# Patient Record
Sex: FEMALE | Race: BLACK OR AFRICAN AMERICAN | NOT HISPANIC OR LATINO
[De-identification: names, ages, dates, MRNs, and addresses within clinical notes are randomized per-mention and may not be internally consistent; named-entity substitution may affect disease eponyms.]

---

## 2019-10-04 ENCOUNTER — TELEPHONE (OUTPATIENT)
Dept: SCHEDULING | Age: 44
End: 2019-10-04

## 2019-11-07 ENCOUNTER — WALK IN (OUTPATIENT)
Dept: URGENT CARE | Age: 44
End: 2019-11-07

## 2019-11-07 DIAGNOSIS — Z11.1 SCREENING FOR TUBERCULOSIS: Primary | ICD-10-CM

## 2019-11-07 PROCEDURE — 86580 TB INTRADERMAL TEST: CPT | Performed by: NURSE PRACTITIONER

## 2019-11-10 ENCOUNTER — WALK IN (OUTPATIENT)
Dept: URGENT CARE | Age: 44
End: 2019-11-10

## 2019-11-10 DIAGNOSIS — Z11.1 SCREENING FOR TUBERCULOSIS: Primary | ICD-10-CM

## 2019-11-10 LAB
INDURATION: NORMAL MM (ref 0–?)
SKIN TEST RESULT: NEGATIVE

## 2019-11-10 PROCEDURE — X1094 NO CHARGE VISIT: HCPCS | Performed by: NURSE PRACTITIONER

## 2020-11-20 ENCOUNTER — TELEPHONE (OUTPATIENT)
Dept: SCHEDULING | Age: 45
End: 2020-11-20

## 2020-11-20 ENCOUNTER — IMMUNIZATION (OUTPATIENT)
Dept: URGENT CARE | Age: 45
End: 2020-11-20

## 2020-11-20 DIAGNOSIS — Z23 NEEDS FLU SHOT: Primary | ICD-10-CM

## 2020-11-20 PROCEDURE — 90686 IIV4 VACC NO PRSV 0.5 ML IM: CPT | Performed by: NURSE PRACTITIONER

## 2020-11-20 PROCEDURE — 90471 IMMUNIZATION ADMIN: CPT | Performed by: NURSE PRACTITIONER

## 2021-07-22 ENCOUNTER — APPOINTMENT (OUTPATIENT)
Dept: RADIOLOGY | Facility: HOSPITAL | Age: 46
End: 2021-07-22
Payer: COMMERCIAL

## 2021-07-22 ENCOUNTER — HOSPITAL ENCOUNTER (EMERGENCY)
Facility: HOSPITAL | Age: 46
Discharge: HOME | End: 2021-07-22
Attending: FAMILY MEDICINE
Payer: COMMERCIAL

## 2021-07-22 VITALS
HEART RATE: 76 BPM | HEIGHT: 66 IN | SYSTOLIC BLOOD PRESSURE: 135 MMHG | BODY MASS INDEX: 40.98 KG/M2 | OXYGEN SATURATION: 94 % | RESPIRATION RATE: 20 BRPM | DIASTOLIC BLOOD PRESSURE: 88 MMHG | WEIGHT: 255 LBS | TEMPERATURE: 98 F

## 2021-07-22 DIAGNOSIS — S80.12XA CONTUSION OF LEFT LOWER EXTREMITY, INITIAL ENCOUNTER: ICD-10-CM
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
## 2021-07-22 DIAGNOSIS — M79.605 LEFT LEG PAIN: ICD-10-CM

## 2021-07-22 DIAGNOSIS — M79.644 FINGER PAIN, RIGHT: ICD-10-CM

## 2021-07-22 DIAGNOSIS — S63.650A SPRAIN OF METACARPOPHALANGEAL (MCP) JOINT OF RIGHT INDEX FINGER, INITIAL ENCOUNTER: ICD-10-CM

## 2021-07-22 PROCEDURE — 99282 EMERGENCY DEPT VISIT SF MDM: CPT

## 2021-07-22 PROCEDURE — 6370000100 HC RX 637 (ALT 250 FOR IP): Performed by: FAMILY MEDICINE

## 2021-07-22 PROCEDURE — 73590 X-RAY EXAM OF LOWER LEG: CPT | Mod: LT

## 2021-07-22 PROCEDURE — 73140 X-RAY EXAM OF FINGER(S): CPT | Mod: RT

## 2021-07-22 PROCEDURE — 99282 EMERGENCY DEPT VISIT SF MDM: CPT | Performed by: FAMILY MEDICINE

## 2021-07-22 PROCEDURE — 99283 EMERGENCY DEPT VISIT LOW MDM: CPT | Performed by: FAMILY MEDICINE

## 2021-07-22 RX ORDER — NAPROXEN 500 MG/1
500 TABLET ORAL 2 TIMES DAILY
COMMUNITY

## 2021-07-22 RX ORDER — TIZANIDINE 2 MG/1
2 TABLET ORAL
COMMUNITY
Start: 2020-10-23

## 2021-07-22 RX ORDER — ACETAMINOPHEN 500 MG
500 TABLET ORAL
COMMUNITY

## 2021-07-22 RX ORDER — FLUOCINOLONE ACETONIDE 0.11 MG/ML
OIL TOPICAL
COMMUNITY
Start: 2020-11-12

## 2021-07-22 RX ORDER — MELOXICAM 15 MG/1
15 TABLET ORAL DAILY
COMMUNITY
Start: 2021-07-12

## 2021-07-22 RX ORDER — AMITRIPTYLINE HYDROCHLORIDE 10 MG/1
TABLET, FILM COATED ORAL
COMMUNITY
Start: 2021-07-12

## 2021-07-22 RX ORDER — HYDROCORTISONE 25 MG/G
CREAM TOPICAL
COMMUNITY
Start: 2020-11-12

## 2021-07-22 RX ORDER — OMEPRAZOLE 20 MG/1
CAPSULE, DELAYED RELEASE ORAL
COMMUNITY
Start: 2020-09-14

## 2021-07-22 RX ORDER — HYDROCODONE BITARTRATE AND ACETAMINOPHEN 5; 325 MG/1; MG/1
1 TABLET ORAL ONCE
Status: COMPLETED | OUTPATIENT
Start: 2021-07-22 | End: 2021-07-22

## 2021-07-22 RX ADMIN — HYDROCODONE BITARTRATE AND ACETAMINOPHEN 1 PACKAGE: 5; 325 TABLET ORAL at 17:06

## 2021-07-22 RX ADMIN — HYDROCODONE BITARTRATE AND ACETAMINOPHEN 1 TABLET: 5; 325 TABLET ORAL at 16:15

## 2021-07-22 NOTE — ED PROVIDER NOTES
EMERGENCY NOTE    Chief Complaint:  Chief Complaint   Patient presents with   • Fall     c/o left shin pain   • Hand Pain     right pointer finger          HPI:  Sourav Delcid is a 46 y.o. female who comes in for evaluation of left shin pain and right index finger pain after a fall.  Patient states that she was over a stack a [employer] and had tripped and fell into an embankment.  She states she landed on her shin and is having pain there as well as in her right finger.  She states her swelling there.  She has some minor abrasions on her hands that she wants to make sure do not need stitches.  She is traveling here.    HISTORY:  Past Medical History:   Diagnosis Date   • Anxiety    • Arthritis    • Endometriosis    • Migraine    • Myofascial pain        Past Surgical History:   Procedure Laterality Date   •  SECTION, LOW TRANSVERSE     • CHOLECYSTECTOMY     • HYSTERECTOMY     • KNEE ARTHROSCOPY     • PELVIC LAPAROSCOPY         History reviewed. No pertinent family history.    Social History     Tobacco Use   • Smoking status: Former Smoker   • Smokeless tobacco: Never Used   Substance Use Topics   • Alcohol use: Yes   • Drug use: Not Currently       Allergies   Allergen Reactions   • Bee Pollen Swelling   • Latex Hives   • Penicillins      Childhood allergy per her mother   • Shellfish Derived          Current Outpatient Medications:   •  amitriptyline (ELAVIL) 10 mg tablet, TAKE 1 TABLET BY MOUTH DAILY, Disp: , Rfl:   •  meloxicam (MOBIC) 15 mg tablet, Take 15 mg by mouth daily, Disp: , Rfl:   •  omeprazole (PriLOSEC) 20 mg capsule, TAKE ONE CAPSULE BY MOUTH EVERY DAY, Disp: , Rfl:   •  naproxen (NAPROSYN) 500 mg tablet, Take 500 mg by mouth 2 times daily, Disp: , Rfl:   •  hydrocortisone 2.5 % cream, Apply topically, Disp: , Rfl:   •  fluocinolone (DERMA-SMOOTHE) 0.01 % external oil, Apply topically, Disp: , Rfl:   •  tiZANidine (ZANAFLEX) 2 mg tablet, Take 2 mg by mouth, Disp: , Rfl:   •  acetaminophen  (TYLENOL) 500 mg tablet, Take 500 mg by mouth, Disp: , Rfl:       ROS:  Review of Systems  12 point review of systems performed that was negative unless otherwise mentioned per HPI.    PE:  ED Triage Vitals [07/22/21 1508]   Temp Heart Rate Resp BP SpO2   36.6 °C (97.9 °F) 100 18 169/98 94 %      Temp Source Heart Rate Source Patient Position BP Location FiO2 (%)   Oral -- Head of bed 30 degrees or higher -- --       Physical Exam  Vitals and nursing note reviewed.   Constitutional:       General: She is not in acute distress.     Appearance: She is obese. She is not ill-appearing or toxic-appearing.   HENT:      Head: Normocephalic and atraumatic.      Nose: Nose normal.      Mouth/Throat:      Mouth: Mucous membranes are moist.   Eyes:      Extraocular Movements: Extraocular movements intact.   Cardiovascular:      Rate and Rhythm: Normal rate and regular rhythm.   Pulmonary:      Effort: Pulmonary effort is normal.      Breath sounds: Normal breath sounds.   Musculoskeletal:      Cervical back: Neck supple.      Comments: Minor abrasion noted on the anterior mid left shin.  No significant bleeding noted.  She has a small hematoma just underneath the abrasion as well.  She is tender to palpation here.  Sensation light touch intact.  Bilateral palmar surfaces of hands have few very shallow minor superficial abrasions.  Her right index finger looks swollen and she has some tenderness at the right MCP joint there she is able to move it.  She has no loss of sensation.  2+ radial pulses noted bilaterally.   Neurological:      Mental Status: She is alert and oriented to person, place, and time.   Psychiatric:         Mood and Affect: Mood normal.           ED LABS:  Labs Reviewed - No data to display    ED IMAGES:  X-ray finger 2 or more views right   Final Result   IMPRESSION:   No acute osseous abnormality      X-ray tibia fibula 2 views left   Final Result   IMPRESSION:   No acute osseous abnormality           PROCEDURE    Procedures      ED MEDS  Medications   HYDROcodone-acetaminophen (NORCO) 5-325 mg per tablet 1 tablet (1 tablet oral Given 7/22/21 1615)   HYDROcodone-acetaminophen (NORCO) 5-325 mg Disp: #12 tablet - ED DOSE PACK 1 Package (1 Package oral ED take home pack 7/22/21 1704)       ED DIAGNOSIS  Final diagnoses:   [W19.XXXA] Fall, initial encounter   [M79.605] Left leg pain   [M79.644] Finger pain, right   [S80.12XA] Contusion of left lower extremity, initial encounter   [S63.650A] Sprain of metacarpophalangeal (MCP) joint of right index finger, initial encounter           ED COURSE:  Sourav is a 46 year old that presents to the emergency department for evaluation of left lower leg pain and right finger pain after a fall that she sustained a stack a leg.  This is a ground-level fall when she tripped.  She is having some significant pain when she presents here and so she was given some hydrocodone to help with that.  Upon reevaluation her pain had significantly improved.  Physical exam as noted above.  Her superficial abrasions on the palmar surfaces of her hands were cleansed thoroughly.  I did get radiographs of her left tib-fib as well as her right index finger to make sure she did not have any associated fractures or anything like that.  These came back normal.  Likely is suffering from some contusion of that leg and finger sprain.  Recommended conservative measures with rest ice elevation and over-the-counter Tylenol ibuprofen help with symptoms she was given a take-home pack for hydrocodone for more severe pain especially at night.  She was discharged home in stable condition.  She will follow-up if she has any worsening symptoms.      Mainor Jackson MD  07/23/21    A voice recognition program was used to aid in medical record documentation. Sometimes words are printed not exactly as they were spoken. While efforts were made to carefully edit and correct any inaccuracies, some errors may be present.  Errors should be taken within the context of the discussion.  Please contact our office if you need assistance interpreting this medical record or notice any mistakes       Mainor Jackson MD  07/23/21 0652

## 2021-07-22 NOTE — DISCHARGE INSTRUCTIONS
You were evaluated in the emergency department after your fall.  We did x-rays of your leg and your right hand and there are no signs of fractures.    You can use ice over these areas to help with pain.  Likely just soft tissue bruising from your fall.  You can take Tylenol or ibuprofen as needed to help with pain.  We will give you something stronger for pain called hydrocodone.  You can take 1 tablet every 4-6 hours as needed for severe pain.  This medicine can be sedating.  Do not drive or operate any heavy machinery after taking this medication.  This medication is very constipating to take stool softeners as well with that.

## 2023-09-20 ENCOUNTER — TELEPHONE (OUTPATIENT)
Dept: RHEUMATOLOGY | Age: 48
End: 2023-09-20

## 2023-11-03 ENCOUNTER — LAB REQUISITION (OUTPATIENT)
Dept: LAB | Age: 48
End: 2023-11-03

## 2023-11-03 DIAGNOSIS — M79.10 MYALGIA, UNSPECIFIED SITE: ICD-10-CM

## 2023-11-03 DIAGNOSIS — Z13.29 ENCOUNTER FOR SCREENING FOR OTHER SUSPECTED ENDOCRINE DISORDER: ICD-10-CM

## 2023-11-03 DIAGNOSIS — Z13.220 ENCOUNTER FOR SCREENING FOR LIPOID DISORDERS: ICD-10-CM

## 2023-11-03 DIAGNOSIS — R14.0 ABDOMINAL DISTENSION (GASEOUS): ICD-10-CM

## 2023-11-03 DIAGNOSIS — M79.7 FIBROMYALGIA: ICD-10-CM

## 2023-11-03 DIAGNOSIS — Z00.01 ENCOUNTER FOR GENERAL ADULT MEDICAL EXAMINATION WITH ABNORMAL FINDINGS: ICD-10-CM

## 2023-11-03 DIAGNOSIS — R19.7 DIARRHEA, UNSPECIFIED: ICD-10-CM

## 2023-11-03 DIAGNOSIS — F41.9 ANXIETY DISORDER, UNSPECIFIED: ICD-10-CM

## 2023-11-03 DIAGNOSIS — Z13.21 ENCOUNTER FOR SCREENING FOR NUTRITIONAL DISORDER: ICD-10-CM

## 2023-11-03 DIAGNOSIS — R53.83 OTHER FATIGUE: ICD-10-CM

## 2023-11-03 DIAGNOSIS — R51.9 HEADACHE: ICD-10-CM

## 2023-11-03 DIAGNOSIS — N80.9 ENDOMETRIOSIS, UNSPECIFIED: ICD-10-CM

## 2023-11-08 LAB
BASOPHILS # BLD: 0.1 K/MCL (ref 0–0.3)
BASOPHILS NFR BLD: 1 %
CORTIS SERPL-MCNC: 7.7 MCG/DL (ref 3.4–22.5)
CRP SERPL HS-MCNC: 11.9 MG/L
DEPRECATED RDW RBC: 42.9 FL (ref 39–50)
EOSINOPHIL # BLD: 0.4 K/MCL (ref 0–0.5)
EOSINOPHIL NFR BLD: 4 %
ERYTHROCYTE [DISTWIDTH] IN BLOOD: 12.8 % (ref 11–15)
HCT VFR BLD CALC: 43.4 % (ref 36–46.5)
HGB BLD-MCNC: 13.2 G/DL (ref 12–15.5)
IMM GRANULOCYTES # BLD AUTO: 0 K/MCL (ref 0–0.2)
IMM GRANULOCYTES # BLD: 0 %
LYMPHOCYTES # BLD: 3.7 K/MCL (ref 1–4.8)
LYMPHOCYTES NFR BLD: 46 %
MCH RBC QN AUTO: 27.7 PG (ref 26–34)
MCHC RBC AUTO-ENTMCNC: 30.4 G/DL (ref 32–36.5)
MCV RBC AUTO: 91.2 FL (ref 78–100)
MONOCYTES # BLD: 0.5 K/MCL (ref 0.3–0.9)
MONOCYTES NFR BLD: 6 %
NEUTROPHILS # BLD: 3.5 K/MCL (ref 1.8–7.7)
NEUTROPHILS NFR BLD: 43 %
NRBC BLD MANUAL-RTO: 0 /100 WBC
PLATELET # BLD AUTO: 440 K/MCL (ref 140–450)
RBC # BLD: 4.76 MIL/MCL (ref 4–5.2)
WBC # BLD: 8.1 K/MCL (ref 4.2–11)

## 2023-11-08 PROCEDURE — PSEU8556 THYROID ANTIBODIES: Performed by: CLINICAL MEDICAL LABORATORY

## 2023-11-08 PROCEDURE — 86664 EPSTEIN-BARR NUCLEAR ANTIGEN: CPT | Performed by: CLINICAL MEDICAL LABORATORY

## 2023-11-08 PROCEDURE — 86665 EPSTEIN-BARR CAPSID VCA: CPT | Performed by: CLINICAL MEDICAL LABORATORY

## 2023-11-08 PROCEDURE — 82533 TOTAL CORTISOL: CPT | Performed by: CLINICAL MEDICAL LABORATORY

## 2023-11-08 PROCEDURE — 86663 EPSTEIN-BARR ANTIBODY: CPT | Performed by: CLINICAL MEDICAL LABORATORY

## 2023-11-08 PROCEDURE — 86376 MICROSOMAL ANTIBODY EACH: CPT | Performed by: CLINICAL MEDICAL LABORATORY

## 2023-11-08 PROCEDURE — 86141 C-REACTIVE PROTEIN HS: CPT | Performed by: CLINICAL MEDICAL LABORATORY

## 2023-11-08 PROCEDURE — PSEU8476 EPSTEIN BARR VIRUS CHRONIC PANEL: Performed by: CLINICAL MEDICAL LABORATORY

## 2023-11-08 PROCEDURE — 86800 THYROGLOBULIN ANTIBODY: CPT | Performed by: CLINICAL MEDICAL LABORATORY

## 2023-11-08 PROCEDURE — 86364 TISS TRNSGLTMNASE EA IG CLAS: CPT | Performed by: CLINICAL MEDICAL LABORATORY

## 2023-11-08 PROCEDURE — 85025 COMPLETE CBC W/AUTO DIFF WBC: CPT | Performed by: CLINICAL MEDICAL LABORATORY

## 2023-11-08 PROCEDURE — PSEU8191 DHEA SULFATE: Performed by: CLINICAL MEDICAL LABORATORY

## 2023-11-08 PROCEDURE — PSEU8506 C REACTIVE PROTEIN HIGH SENSITIVITY: Performed by: CLINICAL MEDICAL LABORATORY

## 2023-11-08 PROCEDURE — 82627 DEHYDROEPIANDROSTERONE: CPT | Performed by: CLINICAL MEDICAL LABORATORY

## 2023-11-08 PROCEDURE — PSEU8470 CELIAC DISEASE SCR 2Y AND OVER: Performed by: CLINICAL MEDICAL LABORATORY

## 2023-11-08 PROCEDURE — PSEU8187 CORTISOL: Performed by: CLINICAL MEDICAL LABORATORY

## 2023-11-08 PROCEDURE — 82784 ASSAY IGA/IGD/IGG/IGM EACH: CPT | Performed by: CLINICAL MEDICAL LABORATORY

## 2023-11-09 LAB
DHEA-S SERPL-MCNC: 29.6 MCG/DL (ref 8–391)
THYROGLOB AB SERPL-ACNC: <0.9 IU/ML (ref 0–4)
THYROPEROXIDASE AB SERPL-ACNC: <28 UNITS/ML

## 2023-11-10 LAB
EBV EA IGG SER-ACNC: <0.2 AI
EBV NA IGG SER-ACNC: >8 AI
EBV VCA IGG SER-ACNC: >8 AI
EBV VCA IGM SER-ACNC: <0.2 AI
IGA SERPL-MCNC: 254 MG/DL (ref 70–400)
TTG IGA SER IA-ACNC: 1 U/ML

## 2024-03-19 ENCOUNTER — HOSPITAL ENCOUNTER (EMERGENCY)
Age: 49
Discharge: HOME | End: 2024-03-19
Attending: EMERGENCY MEDICINE

## 2024-03-19 ENCOUNTER — APPOINTMENT (OUTPATIENT)
Dept: GENERAL RADIOLOGY | Age: 49
End: 2024-03-19
Attending: PHYSICIAN ASSISTANT

## 2024-03-19 VITALS
RESPIRATION RATE: 16 BRPM | WEIGHT: 250 LBS | BODY MASS INDEX: 40.18 KG/M2 | HEART RATE: 89 BPM | DIASTOLIC BLOOD PRESSURE: 83 MMHG | SYSTOLIC BLOOD PRESSURE: 126 MMHG | TEMPERATURE: 98.4 F | HEIGHT: 66 IN | OXYGEN SATURATION: 95 %

## 2024-03-19 DIAGNOSIS — R07.9 CHEST PAIN, UNSPECIFIED TYPE: Primary | ICD-10-CM

## 2024-03-19 LAB
ALBUMIN SERPL-MCNC: 3.3 G/DL (ref 3.6–5.1)
ALBUMIN/GLOB SERPL: 0.6 {RATIO} (ref 1–2.4)
ALP SERPL-CCNC: 117 UNITS/L (ref 45–117)
ALT SERPL-CCNC: 22 UNITS/L
ANION GAP SERPL CALC-SCNC: 8 MMOL/L (ref 7–19)
AST SERPL-CCNC: 14 UNITS/L
ATRIAL RATE (BPM): 86
ATRIAL RATE (BPM): 89
BASOPHILS # BLD: 0.1 K/MCL (ref 0–0.3)
BASOPHILS NFR BLD: 0 %
BILIRUB SERPL-MCNC: 0.5 MG/DL (ref 0.2–1)
BUN SERPL-MCNC: 11 MG/DL (ref 6–20)
BUN/CREAT SERPL: 17 (ref 7–25)
CALCIUM SERPL-MCNC: 9 MG/DL (ref 8.4–10.2)
CHLORIDE SERPL-SCNC: 103 MMOL/L (ref 97–110)
CO2 SERPL-SCNC: 30 MMOL/L (ref 21–32)
CREAT SERPL-MCNC: 0.65 MG/DL (ref 0.51–0.95)
D DIMER PPP FEU-MCNC: 0.39 MG/L (FEU)
DEPRECATED RDW RBC: 40.7 FL (ref 39–50)
EGFRCR SERPLBLD CKD-EPI 2021: >90 ML/MIN/{1.73_M2}
EOSINOPHIL # BLD: 0.1 K/MCL (ref 0–0.5)
EOSINOPHIL NFR BLD: 1 %
ERYTHROCYTE [DISTWIDTH] IN BLOOD: 12.6 % (ref 11–15)
FASTING DURATION TIME PATIENT: ABNORMAL H
FLUAV RNA RESP QL NAA+PROBE: NOT DETECTED
FLUBV RNA RESP QL NAA+PROBE: NOT DETECTED
GLOBULIN SER-MCNC: 5.2 G/DL (ref 2–4)
GLUCOSE SERPL-MCNC: 98 MG/DL (ref 70–99)
HCT VFR BLD CALC: 39 % (ref 36–46.5)
HGB BLD-MCNC: 12.8 G/DL (ref 12–15.5)
IMM GRANULOCYTES # BLD AUTO: 0.1 K/MCL (ref 0–0.2)
IMM GRANULOCYTES # BLD: 0 %
LYMPHOCYTES # BLD: 4.5 K/MCL (ref 1–4)
LYMPHOCYTES NFR BLD: 32 %
MCH RBC QN AUTO: 28.7 PG (ref 26–34)
MCHC RBC AUTO-ENTMCNC: 32.8 G/DL (ref 32–36.5)
MCV RBC AUTO: 87.4 FL (ref 78–100)
MONOCYTES # BLD: 0.9 K/MCL (ref 0.3–0.9)
MONOCYTES NFR BLD: 7 %
NEUTROPHILS # BLD: 8.3 K/MCL (ref 1.8–7.7)
NEUTROPHILS NFR BLD: 60 %
NRBC BLD MANUAL-RTO: 0 /100 WBC
NT-PROBNP SERPL-MCNC: 57 PG/ML
P AXIS (DEGREES): 39
P AXIS (DEGREES): 45
PLATELET # BLD AUTO: 443 K/MCL (ref 140–450)
POTASSIUM SERPL-SCNC: 3.6 MMOL/L (ref 3.4–5.1)
PR-INTERVAL (MSEC): 168
PR-INTERVAL (MSEC): 171
PROT SERPL-MCNC: 8.5 G/DL (ref 6.4–8.2)
QRS-INTERVAL (MSEC): 83
QRS-INTERVAL (MSEC): 87
QT-INTERVAL (MSEC): 318
QT-INTERVAL (MSEC): 321
QTC: 384
QTC: 387
R AXIS (DEGREES): 15
R AXIS (DEGREES): 23
RBC # BLD: 4.46 MIL/MCL (ref 4–5.2)
REPORT TEXT: NORMAL
REPORT TEXT: NORMAL
RSV AG NPH QL IA.RAPID: NOT DETECTED
SARS-COV-2 RNA RESP QL NAA+PROBE: NOT DETECTED
SERVICE CMNT-IMP: NORMAL
SERVICE CMNT-IMP: NORMAL
SODIUM SERPL-SCNC: 137 MMOL/L (ref 135–145)
T AXIS (DEGREES): -79
TROPONIN I SERPL DL<=0.01 NG/ML-MCNC: 6 NG/L
TROPONIN I SERPL DL<=0.01 NG/ML-MCNC: 6 NG/L
VENTRICULAR RATE EKG/MIN (BPM): 86
VENTRICULAR RATE EKG/MIN (BPM): 89
WBC # BLD: 14 K/MCL (ref 4.2–11)

## 2024-03-19 PROCEDURE — 84484 ASSAY OF TROPONIN QUANT: CPT | Performed by: PHYSICIAN ASSISTANT

## 2024-03-19 PROCEDURE — 93010 ELECTROCARDIOGRAM REPORT: CPT | Performed by: INTERNAL MEDICINE

## 2024-03-19 PROCEDURE — 99285 EMERGENCY DEPT VISIT HI MDM: CPT | Performed by: EMERGENCY MEDICINE

## 2024-03-19 PROCEDURE — 93005 ELECTROCARDIOGRAM TRACING: CPT | Performed by: PHYSICIAN ASSISTANT

## 2024-03-19 PROCEDURE — 93005 ELECTROCARDIOGRAM TRACING: CPT | Performed by: EMERGENCY MEDICINE

## 2024-03-19 PROCEDURE — 10002800 HB RX 250 W HCPCS: Performed by: EMERGENCY MEDICINE

## 2024-03-19 PROCEDURE — 83880 ASSAY OF NATRIURETIC PEPTIDE: CPT | Performed by: EMERGENCY MEDICINE

## 2024-03-19 PROCEDURE — 84484 ASSAY OF TROPONIN QUANT: CPT | Performed by: EMERGENCY MEDICINE

## 2024-03-19 PROCEDURE — 0241U COVID/FLU/RSV PANEL: CPT | Performed by: PHYSICIAN ASSISTANT

## 2024-03-19 PROCEDURE — 71046 X-RAY EXAM CHEST 2 VIEWS: CPT

## 2024-03-19 PROCEDURE — 85025 COMPLETE CBC W/AUTO DIFF WBC: CPT | Performed by: PHYSICIAN ASSISTANT

## 2024-03-19 PROCEDURE — 80053 COMPREHEN METABOLIC PANEL: CPT | Performed by: PHYSICIAN ASSISTANT

## 2024-03-19 PROCEDURE — 85379 FIBRIN DEGRADATION QUANT: CPT | Performed by: EMERGENCY MEDICINE

## 2024-03-19 RX ORDER — KETOROLAC TROMETHAMINE 15 MG/ML
15 INJECTION, SOLUTION INTRAMUSCULAR; INTRAVENOUS ONCE
Status: COMPLETED | OUTPATIENT
Start: 2024-03-19 | End: 2024-03-19

## 2024-03-19 RX ADMIN — KETOROLAC TROMETHAMINE 15 MG: 15 INJECTION, SOLUTION INTRAMUSCULAR; INTRAVENOUS at 19:30

## 2024-03-19 ASSESSMENT — HEART SCORE
TROPONIN: EQUAL OR LESS THAN NORMAL LIMIT
EKG: NON SPECIFIC REPOLARIZATION DISTURBANCE
RISK FACTORS: 1-2 RISK FACTORS
HISTORY: SLIGHTLY SUSPICIOUS
HEART SCORE: 3
AGE: GREATER THAN 45 TO LESS THAN 65

## 2024-03-19 ASSESSMENT — PAIN SCALES - GENERAL
PAINLEVEL_OUTOF10: 9
PAINLEVEL_OUTOF10: 0
PAINLEVEL_OUTOF10: 0

## 2024-03-21 ENCOUNTER — OFFICE VISIT (OUTPATIENT)
Dept: CARDIOLOGY | Age: 49
End: 2024-03-21
Attending: EMERGENCY MEDICINE

## 2024-03-21 VITALS
DIASTOLIC BLOOD PRESSURE: 100 MMHG | RESPIRATION RATE: 16 BRPM | HEART RATE: 98 BPM | WEIGHT: 249.7 LBS | SYSTOLIC BLOOD PRESSURE: 134 MMHG | OXYGEN SATURATION: 100 % | BODY MASS INDEX: 40.3 KG/M2

## 2024-03-21 DIAGNOSIS — R07.2 PRECORDIAL PAIN: Primary | ICD-10-CM

## 2024-03-21 DIAGNOSIS — M79.7 FIBROMYALGIA: ICD-10-CM

## 2024-03-21 DIAGNOSIS — I10 PRIMARY HYPERTENSION: ICD-10-CM

## 2024-03-21 DIAGNOSIS — E78.2 MIXED HYPERLIPIDEMIA: ICD-10-CM

## 2024-03-21 DIAGNOSIS — R94.31 ABNORMAL ELECTROCARDIOGRAM (ECG) (EKG): ICD-10-CM

## 2024-03-21 PROCEDURE — 99204 OFFICE O/P NEW MOD 45 MIN: CPT | Performed by: INTERNAL MEDICINE

## 2024-03-21 PROCEDURE — 3075F SYST BP GE 130 - 139MM HG: CPT | Performed by: INTERNAL MEDICINE

## 2024-03-21 PROCEDURE — 3080F DIAST BP >= 90 MM HG: CPT | Performed by: INTERNAL MEDICINE

## 2024-03-21 RX ORDER — MAGNESIUM GLUCONATE 27 MG(500)
500 TABLET ORAL 2 TIMES DAILY
COMMUNITY

## 2024-03-21 RX ORDER — DULOXETIN HYDROCHLORIDE 30 MG/1
30 CAPSULE, DELAYED RELEASE ORAL DAILY
COMMUNITY
Start: 2024-01-19

## 2024-03-21 RX ORDER — EPINEPHRINE 0.3 MG/.3ML
INJECTION SUBCUTANEOUS
COMMUNITY
Start: 2023-11-13

## 2024-03-21 RX ORDER — AMLODIPINE BESYLATE 5 MG/1
5 TABLET ORAL DAILY
Qty: 90 TABLET | Refills: 1 | Status: SHIPPED | OUTPATIENT
Start: 2024-03-21

## 2024-03-21 ASSESSMENT — PAIN SCALES - GENERAL: PAINLEVEL: 0

## 2024-03-31 PROBLEM — M79.7 FIBROMYALGIA: Status: ACTIVE | Noted: 2024-03-31

## 2024-03-31 PROBLEM — R07.2 PRECORDIAL PAIN: Status: ACTIVE | Noted: 2024-03-31

## 2024-03-31 PROBLEM — I10 PRIMARY HYPERTENSION: Status: ACTIVE | Noted: 2024-03-31

## 2024-03-31 PROBLEM — E78.2 MIXED HYPERLIPIDEMIA: Status: ACTIVE | Noted: 2024-03-31

## 2024-03-31 PROBLEM — R94.31 ABNORMAL ELECTROCARDIOGRAM (ECG) (EKG): Status: ACTIVE | Noted: 2024-03-31

## 2024-03-31 ASSESSMENT — ENCOUNTER SYMPTOMS
EYE REDNESS: 0
VOMITING: 0
FACIAL SWELLING: 0
ABDOMINAL DISTENTION: 0
COUGH: 0
SHORTNESS OF BREATH: 0
ALLERGIC/IMMUNOLOGIC NEGATIVE: 1
NEUROLOGICAL NEGATIVE: 1
APPETITE CHANGE: 0
AGITATION: 0
WHEEZING: 0
UNEXPECTED WEIGHT CHANGE: 0
CHILLS: 0
BRUISES/BLEEDS EASILY: 0
WEAKNESS: 0
CHEST TIGHTNESS: 1
HEADACHES: 0
ADENOPATHY: 0
ABDOMINAL PAIN: 0
GASTROINTESTINAL NEGATIVE: 1
EYE ITCHING: 0
FATIGUE: 0
DIZZINESS: 0
SPEECH DIFFICULTY: 0
EYES NEGATIVE: 1
EYE DISCHARGE: 0
ENDOCRINE NEGATIVE: 1
HEMATOLOGIC/LYMPHATIC NEGATIVE: 1
HALLUCINATIONS: 0
SINUS PAIN: 0
COLOR CHANGE: 0
BACK PAIN: 0
POLYDIPSIA: 0
PSYCHIATRIC NEGATIVE: 1

## 2024-04-22 ENCOUNTER — APPOINTMENT (OUTPATIENT)
Dept: CARDIOLOGY | Age: 49
End: 2024-04-22
Attending: INTERNAL MEDICINE

## 2024-04-24 ENCOUNTER — APPOINTMENT (OUTPATIENT)
Dept: CARDIOLOGY | Age: 49
End: 2024-04-24
Attending: INTERNAL MEDICINE

## 2024-04-24 VITALS
DIASTOLIC BLOOD PRESSURE: 88 MMHG | BODY MASS INDEX: 40.02 KG/M2 | HEIGHT: 66 IN | WEIGHT: 249 LBS | SYSTOLIC BLOOD PRESSURE: 140 MMHG

## 2024-04-24 DIAGNOSIS — R07.2 PRECORDIAL PAIN: ICD-10-CM

## 2024-04-24 PROCEDURE — 93015 CV STRESS TEST SUPVJ I&R: CPT | Performed by: INTERNAL MEDICINE

## 2024-04-24 PROCEDURE — A9502 TC99M TETROFOSMIN: HCPCS | Performed by: INTERNAL MEDICINE

## 2024-04-24 PROCEDURE — 78452 HT MUSCLE IMAGE SPECT MULT: CPT | Performed by: INTERNAL MEDICINE

## 2024-04-24 ASSESSMENT — EXERCISE STRESS TEST
PEAK_BP: 152/80
STOPPAGE_REASON: GENERAL FATIGUE
PEAK_RPP: 15504
STAGE_CATEGORIES: RECOVERY 3
PEAK_HR: 102

## 2024-04-25 LAB
HEART RATE RESERVE PREDICTED: -1.18 BPM
LV EF: 80 %
RESTING HR ACHIEVED: 86 BPM
STRESS BASELINE BP: NORMAL MMHG
STRESS PEAK HR: 171 BPM
STRESS PERCENT HR: 101 %
STRESS POST ESTIMATED WORKLOAD: 7 METS
STRESS POST EXERCISE DUR MIN: 5 MIN
STRESS POST PEAK BP: NORMAL MMHG
STRESS TARGET HR: 169 BPM

## 2024-05-10 ENCOUNTER — TELEPHONE (OUTPATIENT)
Dept: CARDIOLOGY | Age: 49
End: 2024-05-10

## 2025-03-25 ENCOUNTER — APPOINTMENT (OUTPATIENT)
Dept: RHEUMATOLOGY | Age: 50
End: 2025-03-25

## 2025-03-25 VITALS
WEIGHT: 262 LBS | DIASTOLIC BLOOD PRESSURE: 92 MMHG | TEMPERATURE: 96.8 F | SYSTOLIC BLOOD PRESSURE: 150 MMHG | HEART RATE: 91 BPM | BODY MASS INDEX: 42.29 KG/M2

## 2025-03-25 DIAGNOSIS — G43.809 OTHER MIGRAINE WITHOUT STATUS MIGRAINOSUS, NOT INTRACTABLE: ICD-10-CM

## 2025-03-25 DIAGNOSIS — M79.7 FIBROMYALGIA: Primary | ICD-10-CM

## 2025-03-25 DIAGNOSIS — K58.9 IRRITABLE BOWEL SYNDROME, UNSPECIFIED TYPE: ICD-10-CM

## 2025-03-25 PROCEDURE — 3080F DIAST BP >= 90 MM HG: CPT | Performed by: INTERNAL MEDICINE

## 2025-03-25 PROCEDURE — 3077F SYST BP >= 140 MM HG: CPT | Performed by: INTERNAL MEDICINE

## 2025-03-25 PROCEDURE — 99213 OFFICE O/P EST LOW 20 MIN: CPT | Performed by: INTERNAL MEDICINE

## 2025-03-25 RX ORDER — CETIRIZINE HYDROCHLORIDE 5 MG/1
TABLET, CHEWABLE ORAL DAILY
COMMUNITY

## 2025-03-25 RX ORDER — MAGNESIUM GLYCINATE 100 MG
CAPSULE ORAL
COMMUNITY

## 2025-03-25 RX ORDER — OMEGA-3/DHA/EPA/FISH OIL 60 MG-90MG
CAPSULE ORAL DAILY
COMMUNITY